# Patient Record
Sex: FEMALE | Race: WHITE | NOT HISPANIC OR LATINO | URBAN - METROPOLITAN AREA
[De-identification: names, ages, dates, MRNs, and addresses within clinical notes are randomized per-mention and may not be internally consistent; named-entity substitution may affect disease eponyms.]

---

## 2017-05-01 PROBLEM — Z00.00 ENCOUNTER FOR PREVENTIVE HEALTH EXAMINATION: Status: ACTIVE | Noted: 2017-05-01

## 2017-05-02 ENCOUNTER — OUTPATIENT (OUTPATIENT)
Dept: OUTPATIENT SERVICES | Facility: HOSPITAL | Age: 47
LOS: 1 days | Discharge: HOME | End: 2017-05-02

## 2017-06-28 DIAGNOSIS — Z80.3 FAMILY HISTORY OF MALIGNANT NEOPLASM OF BREAST: ICD-10-CM

## 2017-10-10 ENCOUNTER — APPOINTMENT (OUTPATIENT)
Dept: BREAST CENTER | Facility: CLINIC | Age: 47
End: 2017-10-10

## 2017-10-20 ENCOUNTER — OUTPATIENT (OUTPATIENT)
Dept: OUTPATIENT SERVICES | Facility: HOSPITAL | Age: 47
LOS: 1 days | Discharge: HOME | End: 2017-10-20

## 2017-10-20 DIAGNOSIS — Z12.31 ENCOUNTER FOR SCREENING MAMMOGRAM FOR MALIGNANT NEOPLASM OF BREAST: ICD-10-CM

## 2017-10-24 ENCOUNTER — APPOINTMENT (OUTPATIENT)
Dept: BREAST CENTER | Facility: CLINIC | Age: 47
End: 2017-10-24

## 2017-11-16 ENCOUNTER — APPOINTMENT (OUTPATIENT)
Dept: BREAST CENTER | Facility: CLINIC | Age: 47
End: 2017-11-16
Payer: COMMERCIAL

## 2017-11-16 VITALS
OXYGEN SATURATION: 98 % | WEIGHT: 118 LBS | DIASTOLIC BLOOD PRESSURE: 80 MMHG | BODY MASS INDEX: 23.16 KG/M2 | SYSTOLIC BLOOD PRESSURE: 110 MMHG | HEIGHT: 60 IN | HEART RATE: 78 BPM

## 2017-11-16 DIAGNOSIS — R92.8 OTHER ABNORMAL AND INCONCLUSIVE FINDINGS ON DIAGNOSTIC IMAGING OF BREAST: ICD-10-CM

## 2017-11-16 DIAGNOSIS — Z78.9 OTHER SPECIFIED HEALTH STATUS: ICD-10-CM

## 2017-11-16 DIAGNOSIS — Z80.3 FAMILY HISTORY OF MALIGNANT NEOPLASM OF BREAST: ICD-10-CM

## 2017-11-16 PROCEDURE — 99212 OFFICE O/P EST SF 10 MIN: CPT

## 2018-06-28 ENCOUNTER — FORM ENCOUNTER (OUTPATIENT)
Age: 48
End: 2018-06-28

## 2018-06-29 ENCOUNTER — OUTPATIENT (OUTPATIENT)
Dept: OUTPATIENT SERVICES | Facility: HOSPITAL | Age: 48
LOS: 1 days | Discharge: HOME | End: 2018-06-29

## 2018-06-29 DIAGNOSIS — R92.8 OTHER ABNORMAL AND INCONCLUSIVE FINDINGS ON DIAGNOSTIC IMAGING OF BREAST: ICD-10-CM

## 2018-11-15 ENCOUNTER — APPOINTMENT (OUTPATIENT)
Dept: BREAST CENTER | Facility: CLINIC | Age: 48
End: 2018-11-15
Payer: COMMERCIAL

## 2019-01-10 ENCOUNTER — APPOINTMENT (OUTPATIENT)
Dept: BREAST CENTER | Facility: CLINIC | Age: 49
End: 2019-01-10
Payer: COMMERCIAL

## 2019-01-10 ENCOUNTER — RESULT CHARGE (OUTPATIENT)
Age: 49
End: 2019-01-10

## 2019-01-10 ENCOUNTER — OUTPATIENT (OUTPATIENT)
Dept: OUTPATIENT SERVICES | Facility: HOSPITAL | Age: 49
LOS: 1 days | Discharge: HOME | End: 2019-01-10

## 2019-01-10 ENCOUNTER — APPOINTMENT (OUTPATIENT)
Dept: OBGYN | Facility: CLINIC | Age: 49
End: 2019-01-10

## 2019-01-10 VITALS
HEART RATE: 81 BPM | BODY MASS INDEX: 23.16 KG/M2 | DIASTOLIC BLOOD PRESSURE: 70 MMHG | SYSTOLIC BLOOD PRESSURE: 118 MMHG | OXYGEN SATURATION: 98 % | WEIGHT: 118 LBS | HEIGHT: 60 IN

## 2019-01-10 VITALS
WEIGHT: 218 LBS | SYSTOLIC BLOOD PRESSURE: 125 MMHG | BODY MASS INDEX: 42.8 KG/M2 | HEIGHT: 60 IN | DIASTOLIC BLOOD PRESSURE: 73 MMHG

## 2019-01-10 DIAGNOSIS — Z01.419 ENCOUNTER FOR GYNECOLOGICAL EXAMINATION (GENERAL) (ROUTINE) W/OUT ABNORMAL FINDINGS: ICD-10-CM

## 2019-01-10 DIAGNOSIS — N62 HYPERTROPHY OF BREAST: ICD-10-CM

## 2019-01-10 LAB
CLARITY UR: CLEAR
GLUCOSE UR-MCNC: NORMAL
HGB UR QL STRIP.AUTO: NORMAL
LEUKOCYTE ESTERASE UR QL STRIP: NORMAL
NITRITE UR QL STRIP: NORMAL
PROT UR STRIP-MCNC: NORMAL

## 2019-01-10 PROCEDURE — 99212 OFFICE O/P EST SF 10 MIN: CPT

## 2019-01-11 ENCOUNTER — FORM ENCOUNTER (OUTPATIENT)
Age: 49
End: 2019-01-11

## 2019-01-11 NOTE — HISTORY OF PRESENT ILLNESS
[1 Year Ago] : 1 year ago [Good] : being in good health [Healthy Diet] : a healthy diet [Regular Exercise] : regular exercise [Up to Date] : up to date with ~his/her~ STD screening [Weight Concerns] : no concerns with her weight [Menstrual Problems] : reports normal menses [Fever] : no fever [Nausea] : no nausea [Vomiting] : no vomiting [Diarrhea] : no diarrhea [Vaginal Bleeding] : no vaginal bleeding [Pelvic Pressure] : no pelvic pressure [Dysuria] : no dysuria

## 2019-01-12 ENCOUNTER — OUTPATIENT (OUTPATIENT)
Dept: OUTPATIENT SERVICES | Facility: HOSPITAL | Age: 49
LOS: 1 days | Discharge: HOME | End: 2019-01-12

## 2019-01-12 DIAGNOSIS — Z12.31 ENCOUNTER FOR SCREENING MAMMOGRAM FOR MALIGNANT NEOPLASM OF BREAST: ICD-10-CM

## 2019-01-14 DIAGNOSIS — Z00.00 ENCOUNTER FOR GENERAL ADULT MEDICAL EXAMINATION WITHOUT ABNORMAL FINDINGS: ICD-10-CM

## 2019-07-07 ENCOUNTER — FORM ENCOUNTER (OUTPATIENT)
Age: 49
End: 2019-07-07

## 2019-07-08 ENCOUNTER — OUTPATIENT (OUTPATIENT)
Dept: OUTPATIENT SERVICES | Facility: HOSPITAL | Age: 49
LOS: 1 days | Discharge: HOME | End: 2019-07-08
Payer: COMMERCIAL

## 2019-07-08 DIAGNOSIS — N64.89 OTHER SPECIFIED DISORDERS OF BREAST: ICD-10-CM

## 2019-07-08 PROCEDURE — 77049 MRI BREAST C-+ W/CAD BI: CPT | Mod: 26

## 2019-07-23 ENCOUNTER — APPOINTMENT (OUTPATIENT)
Dept: BREAST CENTER | Facility: CLINIC | Age: 49
End: 2019-07-23
Payer: COMMERCIAL

## 2019-07-23 VITALS
SYSTOLIC BLOOD PRESSURE: 120 MMHG | TEMPERATURE: 97.8 F | WEIGHT: 112 LBS | HEIGHT: 60 IN | DIASTOLIC BLOOD PRESSURE: 70 MMHG | BODY MASS INDEX: 21.99 KG/M2

## 2019-07-23 PROCEDURE — 99212 OFFICE O/P EST SF 10 MIN: CPT

## 2019-07-23 NOTE — PAST MEDICAL HISTORY
[Menstruating] : The patient is menstruating [Menarche Age ____] : age at menarche was [unfilled] [Regular Cycle Intervals] : have been regular [Total Preg ___] : G[unfilled] [Live Births ___] : P[unfilled]  [Age At Live Birth ___] : Age at live birth: [unfilled] [FreeTextEntry5] : none [FreeTextEntry6] : none [FreeTextEntry7] : OCP for 5 years; discontinued in 1997. [FreeTextEntry8] :  second child for three months.

## 2019-07-23 NOTE — PHYSICAL EXAM
[No Supraclavicular Adenopathy] : no supraclavicular adenopathy [Examined in the supine and seated position] : examined in the supine and seated position [Symmetrical] : symmetrical [No dominant masses] : no dominant masses in right breast  [No dominant masses] : no dominant masses left breast [No Nipple Retraction] : no left nipple retraction [No Nipple Discharge] : no left nipple discharge [Breast Mass Right Breast ___cm] : no masses [Breast Mass Left Breast ___cm] : no masses [Breast Nipple Inversion] : nipples not inverted [Breast Nipple Flattening] : nipples not flattened [Breast Nipple Retraction] : nipples not retracted [Breast Nipple Fissures] : nipples not fissured [Breast Abnormal Secretion Bloody Fluid] : no bloody discharge [Breast Abnormal Lactation (Galactorrhea)] : no galactorrhea [Breast Abnormal Secretion Serous Fluid] : no serous discharge [Breast Abnormal Secretion Opalescent Fluid] : no milky discharge [No Axillary Lymphadenopathy] : no left axillary lymphadenopathy [No Edema] : no edema [No Swelling] : no swelling [Full ROM] : full range of motion [No Rashes] : no rashes [No Ulceration] : no ulceration

## 2019-07-23 NOTE — ASSESSMENT
[FreeTextEntry1] : 49 year old female with history of left breast calcifications not amenable to stereotactic core biopsy, status post lumpectomy demonstrating fibrocystic changes.  She also has a history of a benign left breast core biopsy demonstrating PASH.  She has a strong family history of breast cancer in her sister at 48 and her Mary Model risk assessment for breast cancer is 20% in her lifetime.  She is status post bilateral breast augmentation.\par \par Bilateral screening mammogram was performed in January.  This demonstrated heterogeneously dense breasts with bilateral saline implants intact.  There is an area of benign architectural distortion corresponding to the site of surgery in the left breast.  There are no suspicious masses, groups of calcifications or other abnormalities identified.  Bilateral breast MRI was performed in July for high risk screening.  This demonstrated bilateral scattered similar-enhancing foci most consistent with normal background enhancement.  There is no suspicious abnormal enhancement seen in either breast.\par \par She is here for evaluation of these findings.  At this time, these findings do not present the need for surgical intervention.  She has a benign clinical breast examination and no current complaints related to the breasts. For now, she will need a bilateral screening mammogram for January 2020, with subsequent follow up clinical breast examination.  I spent a total of 10 minutes of face to face time with this patient, greater than 50% of which was spent in counseling and/or coordination of care.  All of her questions were appropriately answered.\par

## 2019-07-23 NOTE — HISTORY OF PRESENT ILLNESS
[FreeTextEntry1] : Patient with history of Left breast calcifications not amenable to stereotactic core biopsy; status post Left NLOC 8/20/12 demonstrating fibrocystic changes with florid duct hyperplasia, sclerosing adenosis, papillary/cystic apocrine metaplasia.\par No prior complaints related to the breasts. \par \par Her family history is significant for her sister with breast cancer at 48.  \par Her Mary Model risk assessment is:\par 2.0 % in five years \par 19.7 % in her lifetime.\par \par Left breast fibrocystic changes and PASH on ultrasound guided core biopsy 11/7/16; 1:00 N5, 12 mm.

## 2020-01-13 ENCOUNTER — FORM ENCOUNTER (OUTPATIENT)
Age: 50
End: 2020-01-13

## 2020-01-14 ENCOUNTER — OUTPATIENT (OUTPATIENT)
Dept: OUTPATIENT SERVICES | Facility: HOSPITAL | Age: 50
LOS: 1 days | Discharge: HOME | End: 2020-01-14
Payer: COMMERCIAL

## 2020-01-14 DIAGNOSIS — Z12.31 ENCOUNTER FOR SCREENING MAMMOGRAM FOR MALIGNANT NEOPLASM OF BREAST: ICD-10-CM

## 2020-01-14 PROCEDURE — 77063 BREAST TOMOSYNTHESIS BI: CPT | Mod: 26

## 2020-01-14 PROCEDURE — 77067 SCR MAMMO BI INCL CAD: CPT | Mod: 26

## 2020-01-21 ENCOUNTER — APPOINTMENT (OUTPATIENT)
Dept: BREAST CENTER | Facility: CLINIC | Age: 50
End: 2020-01-21
Payer: COMMERCIAL

## 2020-01-26 ENCOUNTER — FORM ENCOUNTER (OUTPATIENT)
Age: 50
End: 2020-01-26

## 2020-01-27 ENCOUNTER — OUTPATIENT (OUTPATIENT)
Dept: OUTPATIENT SERVICES | Facility: HOSPITAL | Age: 50
LOS: 1 days | Discharge: HOME | End: 2020-01-27
Payer: COMMERCIAL

## 2020-01-27 DIAGNOSIS — R92.8 OTHER ABNORMAL AND INCONCLUSIVE FINDINGS ON DIAGNOSTIC IMAGING OF BREAST: ICD-10-CM

## 2020-01-27 PROCEDURE — 77065 DX MAMMO INCL CAD UNI: CPT | Mod: 26,RT

## 2020-01-27 PROCEDURE — G0279: CPT | Mod: 26,RT

## 2020-02-24 ENCOUNTER — APPOINTMENT (OUTPATIENT)
Dept: BREAST CENTER | Facility: CLINIC | Age: 50
End: 2020-02-24
Payer: COMMERCIAL

## 2020-02-24 VITALS
SYSTOLIC BLOOD PRESSURE: 112 MMHG | HEIGHT: 60 IN | WEIGHT: 118 LBS | BODY MASS INDEX: 23.16 KG/M2 | TEMPERATURE: 98.3 F | DIASTOLIC BLOOD PRESSURE: 70 MMHG

## 2020-02-24 PROCEDURE — 99212 OFFICE O/P EST SF 10 MIN: CPT

## 2020-02-24 NOTE — ASSESSMENT
[FreeTextEntry1] : 49 year old female who presents today for her clinical breast exam.  She has a history of left breast calcifications not amenable to stereotactic core biopsy, status post lumpectomy 8/20/12 demonstrating fibrocystic changes.  She has no prior complaints related to the breasts and history of bilateral breast augmentation.  Her family history is significant for her sister with breast cancer at 48.  She is high risk for breast cancer with a Mary Model risk assessment of 20% in her lifetime.  \par \par Bilateral screening mammogram was performed in January.  This demonstrated heterogeneously dense breasts.  There are bilateral saline implants. There are calcifications seen in the upper outer quadrant of the right breast.  There are no suspicious masses, groupings of calcifications or other abnormalities identified in the left breast.  Right breast diagnostic mammogram recall demonstrated grouped amorphous calcifications in the upper outer quadrant of the right breast that were likely present on the prior mammogram of 2015.  These are probably benign and short term follow up was recommended.\par \par She is here for evaluation of these findings.  At this time, these findings do not present the need for surgical intervention.  She has a benign clinical breast examination and no current complaints related to the breasts. For now, she will need right breast diagnostic mammogram for July 2020.  She will also be due for bilateral breast MR wwo contrast for high risk screening at that time, with subsequent follow up clinical breast examination.  I spent a total of 10 minutes of face to face time with this patient, greater than 50% of which was spent in counseling and/or coordination of care.  All of her questions were appropriately answered.\par

## 2020-02-24 NOTE — PAST MEDICAL HISTORY
[FreeTextEntry5] : none [FreeTextEntry6] : none [FreeTextEntry8] :  second child for three months.   [FreeTextEntry7] : OCP for 5 years; discontinued in 1997.

## 2020-02-24 NOTE — PHYSICAL EXAM
[Atraumatic] : atraumatic [Normocephalic] : normocephalic [No Supraclavicular Adenopathy] : no supraclavicular adenopathy [Supple] : supple [No Thyromegaly] : no thyromegaly [No Cervical Adenopathy] : no cervical adenopathy [Examined in the supine and seated position] : examined in the supine and seated position [Symmetrical] : symmetrical [No dominant masses] : no dominant masses in right breast  [No dominant masses] : no dominant masses left breast [No Nipple Retraction] : no left nipple retraction [No Nipple Discharge] : no right nipple discharge [Breast Mass Left Breast ___cm] : no masses [Breast Mass Right Breast ___cm] : no masses [Breast Nipple Inversion] : nipples not inverted [Breast Nipple Flattening] : nipples not flattened [Breast Nipple Retraction] : nipples not retracted [Breast Abnormal Secretion Bloody Fluid] : no bloody discharge [Breast Nipple Fissures] : nipples not fissured [Breast Abnormal Lactation (Galactorrhea)] : no galactorrhea [Breast Abnormal Secretion Serous Fluid] : no serous discharge [No Axillary Lymphadenopathy] : no left axillary lymphadenopathy [Breast Abnormal Secretion Opalescent Fluid] : no milky discharge [Full ROM] : full range of motion [No Swelling] : no swelling [No Edema] : no edema [No Ulceration] : no ulceration [No Rashes] : no rashes

## 2020-02-24 NOTE — REVIEW OF SYSTEMS
[Fever] : no fever [Chills] : no chills [Breast Pain] : no breast pain [Breast Lump] : no breast lump [Breast Reddening] : no reddening of the breast [Breast Swelling] : no breast swelling [Breast Itching] : no breast itching [Enlargement] : no breast enlargement [Breast Warmth] : no breast warmth [Dimpling Of Skin] : no dimpling of breast skin [Decreasing In Size] : breast size not decreasing [Nipple Discharge] : no nipple discharge ['Orange Peel' Appearance] : no 'orange peel' appearance of breast skin [Nipple Inverted] : no inversion of the nipple

## 2020-02-24 NOTE — DATA REVIEWED
[FreeTextEntry1] : EXAM: MR BREAST WAW IC BI \par PROCEDURE DATE: 07/08/2019 \par INTERPRETATION: Clinical History / Reason for exam: High risk breast cancer screening. \par Additional history: No personal history of cancer. The patient has a history of left needle biopsy at age 46-\par benign, left excisional biopsy at age 43-benign, bilateral breast augmentation at age 40. The patient has a \par following family history of breast cancer: Sister, age 50, breast cancer. \par Technique: Breast MRI is performed at 1.5 T with the patient prone and the breasts in a dedicated breast coil. \par Following a 3 plane localizer, sagittal T1 weighted, fat-saturated T1 weighted and fat saturated T2-weighted \par sequence; dynamic contrast enhanced sagittal images; and delayed post-contrast axial fat-saturated T1 \par weighted images were obtained. 5 mL gadolinium contrast was injected and 2.5 mL was discarded. Subtraction \par and MIP images were reviewed. [employer] software was used in interpretation. \par Comparison: Comparison is made to the prior breast MRIs dated 6/29/2018 and 5/2/2017. \par Findings: \par Amount of fibroglandular tissue: Heterogeneous fibroglandular tissue \par Background parenchymal enhancement: Mild, Symmetric\par Bilateral retropectoral implants are present. There are bilateral scattered similar-appearing enhancing foci \par within both breasts most consistent with normal background parenchymal enhancement. \par RIGHT BREAST: \par No suspicious abnormal enhancement is seen in the right breast. \par LEFT BREAST:\par No suspicious abnormal enhancement is seen in the left breast. Small focus of susceptibility artifact is noted in \par the upper outer quadrant of the left breast, compatible with a biopsy clip. \par There is no axillary lymphadenopathy. \par Impression: \par No suspicious enhancement in either breast. \par Recommendation: Unless otherwise indicated by clinical findings, annual screening mammography \par recommended. \par BI-RADS Category 1: Negative \par SHELLI VYAS M.D., RESIDENT RADIOLOGIST \par This document has been electronically signed. \par YASMINE VIERA M.D., ATTENDING RADIOLOGIST \par This document has been electronically signed. Jul 8 2019 4:45PM\par \par \par \par EXAM: MG MAMMO SCREEN W DHEERAJ BI# \par PROCEDURE DATE: 01/14/2020 \par INTERPRETATION: HISTORY: \par Bilateral MG MAMMO SCREEN W DHEERAJ BI# was performed. Patient is 49 years old and is seen for screening. The patient \par has no personal history of cancer. The patient has a history of left needle biopsy at age 46 - benign, left excisional biopsy \par at age 43 - benign and bilateral breast augmentation at age 40. The patient has the following family history of breast \par cancer: sister, at age 50, breast cancer. \par RISK ASSESSMENT: \par NCI Lifetime Risk: 21.7 \par Randier-Karelck Lifetime Risk: 16.5 \par CLINICAL BREAST EXAM: \par The patient reports her last clinical breast exam was performed 3 months ago. \par COMPARISON STUDIES: \par The present examination has been compared to prior imaging studies performed at [facility] on \par 04/28/2017, 10/20/2017 and 01/12/2019. \par MAMMOGRAM FINDINGS: \par Mammography was performed including the following views: bilateral craniocaudal implant displaced, bilateral \par craniocaudal with tomosynthesis, bilateral mediolateral oblique implant displaced, and bilateral mediolateral oblique with \par tomosynthesis. The examination includes digital synthetic 2D and digital tomosynthesis 3D images. Additional imaging \par analysis was performed using CAD (computer-aided detection) software. \par The breasts are heterogeneously dense, which may obscure small masses. \par There are bilateral saline implants. \par There are calcifications seen in the upper outer quadrant of the right breast. \par No suspicious mass, grouping of calcifications, or other abnormality is identified in the left breast.\par IMPRESSION: \par Calcifications in the right breast require additional evaluation.\par RECOMMENDATION: \par Patient will be recalled for additional views. \par ASSESSMENT: \par BI-RADS Category 0: Incomplete: Needs Additional Imaging Evaluation \par Given the patient's history, she meets the American Cancer Society guidelines for annual screening with breast MRI in \par addition to annual mammography (i.e., lifetime risk greater than 20-25%) \par The patient will be notified of these results by telephone, and will also be mailed a written summary in layman's terms. \par LOBITO BOBO M.D., RESIDENT RADIOLOGIST \par This document has been electronically signed. \par KARINA HEAD M.D., ATTENDING RADIOLOGIST \par This document has been electronically signed. Jan 14 2020 11:28AM\par \par \par \par \par EXAM: MG MAMMO DIAG W DHEERAJ RT# \par PROCEDURE DATE: 01/27/2020 \par INTERPRETATION: CLINICAL HISTORY: Additional imaging requested from screening mammogram. The patient returns for \par additional imaging of calcifications in the right breast. \par FAMILY HISTORY: Her sister at the age of 50. \par The patient reports her last clinical breast examination was performed 3 months ago. \par COMPARISON: Mammograms dating back to 2015. \par BREAST COMPOSITION: The breasts are heterogeneously dense, which may obscure small masses. \par VIEWS: 2-D/tomosynthesis implant displaced ML and spot magnification ML and CC views were obtained of the right \par breast. Computer-aided detection was utilized by the radiologists in the interpretation of this examination. \par FINDINGS: \par Grouped amorphous calcifications in the upper outer quadrant of the right breast were likely present on the prior \par mammograms of 2015. These are probably benign and continued follow-up is recommended. \par No suspicious masses or areas of architectural distortion are seen. \par IMPRESSION: \par Probably benign calcifications in the right breast as above. \par Recommendation: Follow-up unilateral diagnostic mammogram in 6 months. \par BI-RADS category 3: Probably Benign \par The findings and recommendations were discussed with the patient.\par YASMINE VIERA M.D., ATTENDING RADIOLOGIST \par This document has been electronically signed. Jan 27 2020 8:38AM

## 2020-02-24 NOTE — HISTORY OF PRESENT ILLNESS
[FreeTextEntry1] : Patient with history of Left breast calcifications not amenable to stereotactic core biopsy; status post Left NLOC 8/20/12 demonstrating fibrocystic changes with florid duct hyperplasia, sclerosing adenosis, papillary/cystic apocrine metaplasia.\par No prior complaints related to the breasts. \par \par Her family history is significant for her sister with breast cancer at 48.  \par Her Mary Model risk assessment is:\par 2.0 % in five years \par 19.7 % in her lifetime.\par \par Left breast fibrocystic changes and PASH on ultrasound guided core biopsy 11/7/16; 1:00 N5, 12 mm.\par \par Right brest calcifications seen on screening mammogram 1/14/20 and also on diagnostic call back 1/27/20; likely present in 2015, and requiring short term follow up.  Right breast diagnostic mammogram due for July 2020.

## 2020-08-10 ENCOUNTER — RESULT REVIEW (OUTPATIENT)
Age: 50
End: 2020-08-10

## 2020-08-10 ENCOUNTER — OUTPATIENT (OUTPATIENT)
Dept: OUTPATIENT SERVICES | Facility: HOSPITAL | Age: 50
LOS: 1 days | Discharge: HOME | End: 2020-08-10
Payer: COMMERCIAL

## 2020-08-10 DIAGNOSIS — R92.1 MAMMOGRAPHIC CALCIFICATION FOUND ON DIAGNOSTIC IMAGING OF BREAST: ICD-10-CM

## 2020-08-10 DIAGNOSIS — Z12.39 ENCOUNTER FOR OTHER SCREENING FOR MALIGNANT NEOPLASM OF BREAST: ICD-10-CM

## 2020-08-10 PROCEDURE — 77049 MRI BREAST C-+ W/CAD BI: CPT | Mod: 26

## 2020-12-21 PROBLEM — Z01.419 ENCOUNTER FOR ANNUAL ROUTINE GYNECOLOGICAL EXAMINATION: Status: RESOLVED | Noted: 2019-01-11 | Resolved: 2020-12-21

## 2021-01-29 ENCOUNTER — NON-APPOINTMENT (OUTPATIENT)
Age: 51
End: 2021-01-29

## 2021-01-29 ENCOUNTER — OUTPATIENT (OUTPATIENT)
Dept: OUTPATIENT SERVICES | Facility: HOSPITAL | Age: 51
LOS: 1 days | Discharge: HOME | End: 2021-01-29
Payer: COMMERCIAL

## 2021-01-29 ENCOUNTER — RESULT REVIEW (OUTPATIENT)
Age: 51
End: 2021-01-29

## 2021-01-29 DIAGNOSIS — R92.8 OTHER ABNORMAL AND INCONCLUSIVE FINDINGS ON DIAGNOSTIC IMAGING OF BREAST: ICD-10-CM

## 2021-01-29 PROCEDURE — 77066 DX MAMMO INCL CAD BI: CPT | Mod: 26

## 2021-01-29 PROCEDURE — G0279: CPT | Mod: 26

## 2021-03-25 ENCOUNTER — APPOINTMENT (OUTPATIENT)
Dept: BREAST CENTER | Facility: CLINIC | Age: 51
End: 2021-03-25
Payer: COMMERCIAL

## 2021-03-25 VITALS
DIASTOLIC BLOOD PRESSURE: 78 MMHG | TEMPERATURE: 98 F | HEIGHT: 60 IN | WEIGHT: 125 LBS | BODY MASS INDEX: 24.54 KG/M2 | SYSTOLIC BLOOD PRESSURE: 118 MMHG

## 2021-03-25 PROCEDURE — 99072 ADDL SUPL MATRL&STAF TM PHE: CPT

## 2021-03-25 PROCEDURE — 99212 OFFICE O/P EST SF 10 MIN: CPT

## 2021-03-25 NOTE — ASSESSMENT
[FreeTextEntry1] : CROW is a [place] 50 year old patient who presented today in follow up for a history of fibrocystic breast changes; family hx of breast cancer; high risk screening.  \par She has been doing well with no new breast related complaints. \par Imaging was done recently which revealed no significant interval change on the left side. Yearly left breast screening mammogram is suggested. Stable probably benign group of amorphous calcifications at the right lateral central breast. Short-term follow-up mammogram is suggested in 6 months, as detailed above. \par Physical exam was unrevealing today.\par \par Imaging with a right breast unilateral diagnostic mammogram will be due in July 2021 for short term follow-up on calcifications, and that will be scheduled today. \par She will also be scheduled for bilateral breast MRI in August 2021 for high risk screening.\par She will return for follow-up and clinical breast exam in six months.\par \par I spent a total of 15 minutes of face to face time with this patient, greater than 50% of which was spent in counseling and/or coordination of care.\par All of her questions were appropriately answered.\par She knows to call with any concerns.\par \par \par

## 2021-03-25 NOTE — HISTORY OF PRESENT ILLNESS
[FreeTextEntry1] : Patient with history of Left breast calcifications not amenable to stereotactic core biopsy; status post Left NLOC 8/20/12 demonstrating fibrocystic changes with florid duct hyperplasia, sclerosing adenosis, papillary/cystic apocrine metaplasia.\par No prior complaints related to the breasts. \par \par Her family history is significant for her sister with breast cancer at 48.  \par Her Mary Model risk assessment is:\par 2.0 % in five years \par 19.7 % in her lifetime.\par \par Left breast fibrocystic changes and PASH on ultrasound guided core biopsy 11/7/16; 1:00 N5, 12 mm.\par \par \par CROW WEATHERS is a 50 year old female patient who presents today in follow up for fibrocystic breast changes; family hx of breast cancer; high risk screening.\par Since her last visit, she has no new breast related complaints. \par Imaging was done on 01/29/2021, which revealed no significant interval change on the left side. Yearly left breast screening mammogram is suggested. Stable probably benign group of amorphous calcifications at the right lateral central breast. Short-term follow-up mammogram is suggested in 6 months.\par \par She presents today for evaluation and imaging review.

## 2021-03-25 NOTE — DATA REVIEWED
[FreeTextEntry1] : B/L Breast MRI - 08/10/2020:\par Findings:\par \par Amount of fibroglandular tissue: Heterogeneous fibroglandular tissue\par \par Background parenchymal enhancement: Mild, Symmetric\par \par Bilateral retropectoral saline implants are present.\par \par RIGHT BREAST:\par No enhancing mass, architectural distortion, or suspicious area of enhancement is identified.\par \par The nipple and skin appear normal.\par There is no axillary adenopathy.\par \par LEFT BREAST:\par No enhancing mass, architectural distortion, or suspicious area of enhancement is identified.\par The nipple and skin appear normal.\par There is no axillary adenopathy.\par \par The imaged portions of the chest and abdomen are unremarkable.\par \par Impression:\par \par No suspicious enhancement in either breast.\par Bilateral retropectoral saline implants.\par \par Recommendation: Unless otherwise indicated by clinical findings, annual screening mammography recommended.\par \par BI-RADS Category 1: Negative\par \par \par B/L Dx Mammo - 01/29/2021:\par BREAST COMPOSITION: The breasts are heterogeneously dense, which may obscure small masses.\par \par FINDINGS:\par \par MAMMOGRAM:\par \par There are bilateral retropectoral saline implants.\par The previously seen group of probably benign microcalcifications in the right lateral central breast is again identified, without significant interval change. These calcifications are likely present on prior study of 2015.\par \par A biopsy marker is present in the left lateral breast. A group of calcifications in the left upper outer quadrant is unchanged dating back to at least 2015. A subcentimeter obscured mass in the left superior breast is also stable. Mild postsurgical distortion is present in the left superior breast, unchanged.\par \par No new mass or suspicious groups of microcalcifications are seen.\par \par Skin, nipples and subcutaneous tissues are unremarkable.\par \par IMPRESSION:\par \par No significant interval change on the left side. Yearly left breast screening mammogram is suggested.\par \par Stable probably benign group of amorphous calcifications at the right lateral central breast. Short-term follow-up mammogram is suggested in 6 months.\par \par Recommendation: Follow-up unilateral diagnostic mammogram in 6 months.\par \par BI-RADS category 3: Probably Benign

## 2021-03-25 NOTE — PHYSICAL EXAM
[Normocephalic] : normocephalic [Atraumatic] : atraumatic [No Supraclavicular Adenopathy] : no supraclavicular adenopathy [No Cervical Adenopathy] : no cervical adenopathy [Examined in the supine and seated position] : examined in the supine and seated position [No dominant masses] : no dominant masses in right breast  [No dominant masses] : no dominant masses left breast [No Nipple Discharge] : no left nipple discharge [No Axillary Lymphadenopathy] : no left axillary lymphadenopathy [No Rashes] : no rashes [No Ulceration] : no ulceration [Breast Nipple Inversion] : nipples not inverted [Breast Nipple Retraction] : nipples not retracted [de-identified] : well healed surgical scars.

## 2021-03-25 NOTE — REASON FOR VISIT
[Follow-Up: _____] : a [unfilled] follow-up visit [FreeTextEntry1] : fibrocystic breast changes; family hx of breast cancer; high risk screening; imaging review.

## 2021-07-30 ENCOUNTER — OUTPATIENT (OUTPATIENT)
Dept: OUTPATIENT SERVICES | Facility: HOSPITAL | Age: 51
LOS: 1 days | Discharge: HOME | End: 2021-07-30
Payer: COMMERCIAL

## 2021-07-30 ENCOUNTER — RESULT REVIEW (OUTPATIENT)
Age: 51
End: 2021-07-30

## 2021-07-30 DIAGNOSIS — R92.8 OTHER ABNORMAL AND INCONCLUSIVE FINDINGS ON DIAGNOSTIC IMAGING OF BREAST: ICD-10-CM

## 2021-07-30 PROCEDURE — 77065 DX MAMMO INCL CAD UNI: CPT | Mod: 26,RT

## 2021-07-30 PROCEDURE — G0279: CPT | Mod: 26

## 2021-08-03 ENCOUNTER — NON-APPOINTMENT (OUTPATIENT)
Age: 51
End: 2021-08-03

## 2021-09-02 ENCOUNTER — OUTPATIENT (OUTPATIENT)
Dept: OUTPATIENT SERVICES | Facility: HOSPITAL | Age: 51
LOS: 1 days | Discharge: HOME | End: 2021-09-02
Payer: COMMERCIAL

## 2021-09-02 ENCOUNTER — RESULT REVIEW (OUTPATIENT)
Age: 51
End: 2021-09-02

## 2021-09-02 DIAGNOSIS — N60.12 DIFFUSE CYSTIC MASTOPATHY OF LEFT BREAST: ICD-10-CM

## 2021-09-02 DIAGNOSIS — Z12.39 ENCOUNTER FOR OTHER SCREENING FOR MALIGNANT NEOPLASM OF BREAST: ICD-10-CM

## 2021-09-02 PROCEDURE — 77049 MRI BREAST C-+ W/CAD BI: CPT | Mod: 26

## 2021-09-10 ENCOUNTER — APPOINTMENT (OUTPATIENT)
Dept: BREAST CENTER | Facility: CLINIC | Age: 51
End: 2021-09-10

## 2021-09-22 ENCOUNTER — APPOINTMENT (OUTPATIENT)
Dept: BREAST CENTER | Facility: CLINIC | Age: 51
End: 2021-09-22
Payer: COMMERCIAL

## 2021-09-22 PROCEDURE — 99212 OFFICE O/P EST SF 10 MIN: CPT

## 2021-09-22 NOTE — HISTORY OF PRESENT ILLNESS
[FreeTextEntry1] : Patient with history of Left breast calcifications not amenable to stereotactic core biopsy; status post Left NLOC 8/20/12 demonstrating fibrocystic changes with florid duct hyperplasia, sclerosing adenosis, papillary/cystic apocrine metaplasia.\par No prior complaints related to the breasts. \par B/L saline implants - subpectoral.\par \par Her family history is significant for her sister with breast cancer at 48.  \par \par Her Mary Model risk assessment is:\par 2.0 % in five years \par 19.7 % in her lifetime.\par \par Left breast fibrocystic changes and PASH on ultrasound guided core biopsy 11/7/16; 1:00 N5, 12 mm.\par \par \par CROW WEATHERS is a 51 year old female patient who presents today in follow up for fibrocystic breast changes; family hx of breast cancer; high risk screening.\par Since her last visit, she has no new breast related complaints. \par Imaging of the right breast was done on 07/30/2021 for short term follow-up, which revealed grouped amorphous calcifications in the right lateral breast demonstrate the suggestion of rim formation. In addition, these were likely present on prior mammograms dating back to 2016. These are stable from 1/27/2020 and are probably benign. \par She had a B/L breast MRI on 09/02/2021 for high risk screening, which revealed no MR evidence of malignancy in either breast.\par \par She presents today for evaluation and imaging review.

## 2021-09-22 NOTE — DATA REVIEWED
[FreeTextEntry1] : Right Uni Dx Mammo - 07/30/2021:\par BREAST COMPOSITION: The breasts are heterogeneously dense, which may obscure small masses.\par \par FINDINGS:\par \par MAMMOGRAM:\par Grouped amorphous calcifications in the right lateral breast demonstrate the suggestion of rim formation. In addition, these were likely present on prior mammograms dating back to 2016. These are stable from 1/27/2020 and are probably benign. Continued mammographic follow-up is recommended to demonstrate stability.\par \par No suspicious masses or areas of architectural distortion are seen in the right breast.\par \par IMPRESSION:\par \par Probably benign right breast calcifications as above.\par \par Recommendation: Follow-up bilateral diagnostic mammogram in 6 months.\par \par BI-RADS category 3: Probably Benign\par \par \par \par B/L Breast MRI - 09/02/2021:\par Findings:\par \par Amount of fibroglandular tissue: Heterogeneous fibroglandular tissue\par \par Background parenchymal enhancement: Mild, Symmetric\par \par Bilateral retropectoral implants.\par \par RIGHT BREAST:\par No suspicious enhancing mass or suspicious area of enhancement is identified.\par \par The nipple and skin appear normal.\par There is no axillary adenopathy.\par \par LEFT BREAST:\par No suspicious enhancing mass or suspicious area of enhancement is identified. Susceptibility artifact from biopsy marker in the lateral left breast.\par \par The nipple and skin appear normal.\par There is no axillary adenopathy.\par \par The imaged portions of the chest and abdomen are unremarkable.\par \par Impression:\par \par No MR evidence of malignancy in either breast.\par \par Recommendation: Unless otherwise indicated by clinical findings, annual screening mammography recommended.\par \par BI-RADS Category 1: Negative

## 2021-09-22 NOTE — ASSESSMENT
[FreeTextEntry1] : CROW is a [place] 51 year old patient who presented today in follow up for a history of fibrocystic breast changes; family hx of breast cancer; high risk screening.  \par She has been doing well with no new breast related complaints. \par Imaging of the right breast was done on 07/30/2021 for short term follow-up, which revealed grouped amorphous calcifications in the right lateral breast demonstrate the suggestion of rim formation. In addition, these were likely present on prior mammograms dating back to 2016. These are stable from 1/27/2020 and are probably benign. \par She had a B/L breast MRI on 09/02/2021 for high risk screening, which revealed no MR evidence of malignancy in either breast, as detailed above. \par Physical exam was unrevealing today.\par \par Imaging with a bilateral diagnostic mammogram will be due in Jan/Feb 2022 for short term follow-up on calcifications, and that will be scheduled today.\par She will return for follow-up and clinical breast exam in six months.\par \par The patient was informed that Dr. Tatyana Mg will no longer be practicing here as of the end of August 2021; her care will be continued with the practice.\par \par I spent a total of 15 minutes of face to face time with this patient, greater than 50% of which was spent in counseling and/or coordination of care.\par All of her questions were appropriately answered.\par She knows to call with any concerns.

## 2021-09-22 NOTE — PHYSICAL EXAM
[Normocephalic] : normocephalic [Atraumatic] : atraumatic [No Supraclavicular Adenopathy] : no supraclavicular adenopathy [No dominant masses] : no dominant masses in right breast  [No dominant masses] : no dominant masses left breast [No Nipple Discharge] : no left nipple discharge [No Rashes] : no rashes [No Ulceration] : no ulceration [Breast Nipple Inversion] : nipples not inverted [Breast Nipple Retraction] : nipples not retracted [de-identified] : well healed surgical scars. [de-identified] : No axillary lymphadenopathy appreciated. [de-identified] : No axillary lymphadenopathy appreciated.

## 2022-02-01 ENCOUNTER — NON-APPOINTMENT (OUTPATIENT)
Age: 52
End: 2022-02-01

## 2022-03-01 ENCOUNTER — RESULT REVIEW (OUTPATIENT)
Age: 52
End: 2022-03-01

## 2022-03-01 ENCOUNTER — OUTPATIENT (OUTPATIENT)
Dept: OUTPATIENT SERVICES | Facility: HOSPITAL | Age: 52
LOS: 1 days | Discharge: HOME | End: 2022-03-01
Payer: COMMERCIAL

## 2022-03-01 DIAGNOSIS — R92.8 OTHER ABNORMAL AND INCONCLUSIVE FINDINGS ON DIAGNOSTIC IMAGING OF BREAST: ICD-10-CM

## 2022-03-01 PROCEDURE — G0279: CPT | Mod: 26

## 2022-03-01 PROCEDURE — 77066 DX MAMMO INCL CAD BI: CPT | Mod: 26

## 2022-03-15 ENCOUNTER — APPOINTMENT (OUTPATIENT)
Dept: OBGYN | Facility: CLINIC | Age: 52
End: 2022-03-15
Payer: COMMERCIAL

## 2022-03-15 VITALS
HEIGHT: 60 IN | BODY MASS INDEX: 22.19 KG/M2 | SYSTOLIC BLOOD PRESSURE: 138 MMHG | DIASTOLIC BLOOD PRESSURE: 90 MMHG | WEIGHT: 113 LBS

## 2022-03-15 PROCEDURE — 99386 PREV VISIT NEW AGE 40-64: CPT

## 2022-03-15 NOTE — HISTORY OF PRESENT ILLNESS
[TextBox_4] : CROW WEATHERS is a 51 year female\par for pap  Hypomagnesemia BPH with obstruction/lower urinary tract symptoms

## 2022-03-17 LAB — HPV HIGH+LOW RISK DNA PNL CVX: NOT DETECTED

## 2022-03-22 LAB — CYTOLOGY CVX/VAG DOC THIN PREP: NORMAL

## 2022-06-14 ENCOUNTER — APPOINTMENT (OUTPATIENT)
Dept: BREAST CENTER | Facility: CLINIC | Age: 52
End: 2022-06-14
Payer: COMMERCIAL

## 2022-06-14 VITALS
WEIGHT: 110 LBS | HEIGHT: 60 IN | SYSTOLIC BLOOD PRESSURE: 136 MMHG | BODY MASS INDEX: 21.6 KG/M2 | DIASTOLIC BLOOD PRESSURE: 86 MMHG | HEART RATE: 73 BPM

## 2022-06-14 DIAGNOSIS — R92.1 MAMMOGRAPHIC CALCIFICATION FOUND ON DIAGNOSTIC IMAGING OF BREAST: ICD-10-CM

## 2022-06-14 PROCEDURE — 99212 OFFICE O/P EST SF 10 MIN: CPT

## 2022-06-14 NOTE — HISTORY OF PRESENT ILLNESS
[FreeTextEntry1] : Patient with history of Left breast calcifications not amenable to stereotactic core biopsy; status post Left NLOC 8/20/12 demonstrating fibrocystic changes with florid duct hyperplasia, sclerosing adenosis, papillary/cystic apocrine metaplasia.\par No prior complaints related to the breasts. \par B/L saline implants - subpectoral.\par \par Her family history is significant for her sister with breast cancer at 48.  \par \par Her Mary Model risk assessment is:\par 2.0 % in five years \par 19.7 % in her lifetime.\par \par Left breast fibrocystic changes and PASH on ultrasound guided core biopsy 11/7/16; 1:00 N5, 12 mm.\par \par \par CROW WEATHERS is a 51 year old female patient who presents today in follow up for fibrocystic breast changes; family hx of breast cancer; high risk screening.\par Since her last visit, she has no new breast related complaints. \par Imaging of the right breast was done on 07/30/2021 for short term follow-up, which revealed grouped amorphous calcifications in the right lateral breast demonstrate the suggestion of rim formation. In addition, these were likely present on prior mammograms dating back to 2016. These are stable from 1/27/2020 and are probably benign. \par She had a B/L breast MRI on 09/02/2021 for high risk screening, which revealed no MR evidence of malignancy in either breast.\par \par INTERVAL HISTORY 6/14/22\garth Donohue is here for her six months follow up visit \par She has no breast related complaints at this time.  She denies any breast pain, has not palpated any new palpable masses in either breast and denies any nipple discharge or retraction.\par \par Her imaging is as follows:\par 03/01/2022 b/l dx mammo\par - breasts are heterogeneously dense\par -Grouped amorphous calcifications in the right lateral breast demonstrate the suggestion of rim formation.\par In addition, these were likely present on prior mammograms dating back to 2017. These are stable from 2020. 2 year stability supports a benign etiology. \par -Additional stable calcifications are seen within the left breast.\par - There is a new benign rim calcification in the central right breast. BI-RADS 2

## 2022-06-14 NOTE — ASSESSMENT
[FreeTextEntry1] : CROW is a [place] 51 year old patient who presented today in follow up for a history of fibrocystic breast changes; family hx of breast cancer; high risk screening.  \par She has been doing well with no new breast related complaints. \par Physical exam was unrevealing today.\par \par Her imaging is as follows:\par 03/01/2022 b/l dx mammo\par - breasts are heterogeneously dense\par -Grouped amorphous calcifications in the right lateral breast demonstrate the suggestion of rim formation.\par In addition, these were likely present on prior mammograms dating back to 2017. These are stable from 2020. 2 year stability supports a benign etiology. \par -Additional stable calcifications are seen within the left breast.\par - There is a new benign rim calcification in the central right breast. BI-RADS 2\par \par \par We discussed dense breasts.  Increasing breast density has been found to increase ones risk of breast cancer, but at this time, there is no clear indication for additional imaging in this setting, as both US and MRI have not been found to improve survival.  One can consider bilateral screening US.  However, out of 1000 women screened, the use of routine US will only identify an additional 3-5 cancers.  The use of US was found to increase the likelihood of undergoing more imaging and more biopsies.  She does have dense breasts.  We have decided to proceed with screening bilateral breast US at this time.  This will be scheduled with her next screening mammogram.\par \par \par PLAN:\par -MRI  in September 2022\par -bilateral mammogram and US  will be due in March 2023 \par -follow up after \par \par All of her questions were appropriately answered.\par She knows to call with any concerns.

## 2022-06-14 NOTE — PHYSICAL EXAM
[Normocephalic] : normocephalic [Atraumatic] : atraumatic [EOMI] : extra ocular movement intact [Examined in the supine and seated position] : examined in the supine and seated position [Symmetrical] : symmetrical [No dominant masses] : no dominant masses in right breast  [No dominant masses] : no dominant masses left breast [No Nipple Retraction] : no left nipple retraction [No Nipple Discharge] : no left nipple discharge [No Axillary Lymphadenopathy] : no left axillary lymphadenopathy [No Edema] : no edema [No Rashes] : no rashes [No Ulceration] : no ulceration [de-identified] : On physical exam, there are no discrete masses in either breast or axilla. There is no nipple discharge or inversion bilaterally. There are no skin changes bilaterally. B/l implants intact and soft on palpation \par \par

## 2022-06-14 NOTE — DATA REVIEWED
[FreeTextEntry1] : EXAM:  MG MAMMO DIAG W DHEERAJ BI#\par \par \par PROCEDURE DATE:  03/01/2022\par \par \par \par INTERPRETATION:  Clinical History / Reason for exam:  Follow-up of probably benign right breast calcifications first identified in January 2020.\par \par The patient reports her last clinical breast examination was performed for months ago.\par \par FAMILY HISTORY: Sister, at age 50, breast cancer.\par \par COMPARISONS: Multiple mammograms dating back to 10/20/2017.\par \par VIEWS OBTAINED: Routine and pushback views of both breasts as well as spot compression magnification views of the right breast.. Tomosynthesis images were obtained as well\par \par Computer-aided detection was utilized in the interpretation of this examination.\par \par BREAST COMPOSITION: The breasts are heterogeneously dense, which may obscure small masses.\par \par FINDINGS:\par \par MAMMOGRAM:\par Grouped amorphous calcifications in the right lateral breast demonstrate the suggestion of rim formation. In addition, these were likely present on prior mammograms dating back to 2017. These are stable from 2020. 2 year stability supports a benign etiology. Additional stable calcifications are seen within the left breast. There is a new benign rim calcification in the central right breast. No suspicious masses or areas of architectural distortion are seen in either breast. When compared to prior studies there is no significant interval change.\par \par IMPRESSION:\par \par Benign right breast calcifications as above. No mammographic evidence of malignancy in either breast.\par \par Recommendation: Unless otherwise indicated by clinical findings, the patient should resume annual screening in one year. Additionally given the patient's breast density and family history continued supplemental screening with breast MRI would be recommended.\par \par BI-RADS Category 2: Benign\par \par

## 2023-03-03 ENCOUNTER — OUTPATIENT (OUTPATIENT)
Dept: OUTPATIENT SERVICES | Facility: HOSPITAL | Age: 53
LOS: 1 days | End: 2023-03-03
Payer: COMMERCIAL

## 2023-03-03 ENCOUNTER — RESULT REVIEW (OUTPATIENT)
Age: 53
End: 2023-03-03

## 2023-03-03 DIAGNOSIS — R92.2 INCONCLUSIVE MAMMOGRAM: ICD-10-CM

## 2023-03-03 DIAGNOSIS — Z12.31 ENCOUNTER FOR SCREENING MAMMOGRAM FOR MALIGNANT NEOPLASM OF BREAST: ICD-10-CM

## 2023-03-03 DIAGNOSIS — Z00.8 ENCOUNTER FOR OTHER GENERAL EXAMINATION: ICD-10-CM

## 2023-03-03 PROCEDURE — 76641 ULTRASOUND BREAST COMPLETE: CPT | Mod: 50

## 2023-03-03 PROCEDURE — 77063 BREAST TOMOSYNTHESIS BI: CPT | Mod: 26

## 2023-03-03 PROCEDURE — 77067 SCR MAMMO BI INCL CAD: CPT | Mod: 26

## 2023-03-03 PROCEDURE — 77067 SCR MAMMO BI INCL CAD: CPT

## 2023-03-03 PROCEDURE — 77063 BREAST TOMOSYNTHESIS BI: CPT

## 2023-03-03 PROCEDURE — 76641 ULTRASOUND BREAST COMPLETE: CPT | Mod: 26,50

## 2023-03-06 ENCOUNTER — NON-APPOINTMENT (OUTPATIENT)
Age: 53
End: 2023-03-06

## 2023-06-14 ENCOUNTER — APPOINTMENT (OUTPATIENT)
Dept: BREAST CENTER | Facility: CLINIC | Age: 53
End: 2023-06-14
Payer: COMMERCIAL

## 2023-06-14 VITALS
DIASTOLIC BLOOD PRESSURE: 81 MMHG | WEIGHT: 117 LBS | HEIGHT: 60 IN | SYSTOLIC BLOOD PRESSURE: 127 MMHG | BODY MASS INDEX: 22.97 KG/M2

## 2023-06-14 PROCEDURE — 99213 OFFICE O/P EST LOW 20 MIN: CPT

## 2023-06-15 NOTE — HISTORY OF PRESENT ILLNESS
[FreeTextEntry1] : Patient with history of Left breast calcifications not amenable to stereotactic core biopsy; status post Left NLOC 8/20/12 demonstrating fibrocystic changes with florid duct hyperplasia, sclerosing adenosis, papillary/cystic apocrine metaplasia.\par No prior complaints related to the breasts. \par B/L saline implants - subpectoral.\par \par Her family history is significant for her sister with breast cancer at 48.  \par \par Her Mary Model risk assessment is:\par 2.0 % in five years \par 19.7 % in her lifetime.\par \par Left breast fibrocystic changes and PASH on ultrasound guided core biopsy 11/7/16; 1:00 N5, 12 mm.\par \par \par CROW WEATHERS is a 52 year old female patient who presents today in follow up for fibrocystic breast changes; family hx of breast cancer; high risk screening.\par Since her last visit, she has no new breast related complaints. \par \par Most recent imaging:\par B/L Screening Mammo - 03/03/2023:\par -The breasts are heterogeneously dense, which may obscure small masses.\par -There are bilateral saline implants.\par -There is a stable circumscribed mass seen in the right breast.\par -There are stable calcifications seen in both breasts.\par -There is no mammographic evidence of malignancy.\par BI-RADS Category 2:  Benign\par \par B/L Breast Sono - 03/03/2023:\par -Bilateral implants are partially visualized.\par Right breast:\par -3:00 position, there is a circumscribed hypoechoic mass measuring 0.9 x 0.6 x 0.3 cm, likely corresponding to a mammographically stable mass and is probably benign. Short interval follow-up is recommended.\par Left breast:\par -Previously biopsied mass at the 1:00 position 5 cm from the nipple the longer seen on the current exam.\par Impression: No sonographic evidence of malignancy.\par BI-RADS category 3: Probably Benign\par \par She presents today for evaluation and imaging review.

## 2023-06-15 NOTE — DATA REVIEWED
[FreeTextEntry1] : B/L Screening Mammo - 03/03/2023:\par MAMMOGRAM FINDINGS:\par Mammography was performed including the following views: bilateral \par craniocaudal with tomosynthesis, bilateral mediolateral oblique with \par tomosynthesis.  The examination includes digital synthetic 2D and digital \par tomosynthesis 3D images. Additional imaging analysis was performed using \par CAD (computer-aided detection) software.\par \par The breasts are heterogeneously dense, which may obscure small masses.\par \par There are bilateral saline implants.\par \par Finding 1:  There is a stable circumscribed mass seen in the right breast.\par \par Finding 2:  There are stable calcifications seen in both breasts.\par \par No suspicious mass, grouping of calcifications, or other abnormality is \par identified.\par \par IMPRESSION:\par There is no mammographic evidence of malignancy.\par \par RECOMMENDATION:\par Unless otherwise indicated by clinical findings, annual screening \par mammography recommended.\par \par ASSESSMENT:\par BI-RADS Category 2:  Benign\par \par \par B/L Breast Sono - 03/03/2023:\par Findings:\par \par Ultrasound:\par \par Bilateral whole breast ultrasound was performed.\par \par Bilateral implants are partially visualized.\par \par Right breast:\par At the 3:00 position, there is a circumscribed hypoechoic mass measuring \par 0.9 x 0.6 x 0.3 cm, likely corresponding to a mammographically stable \par mass and is probably benign. Short interval follow-up is recommended.\par \par No additional solid or cystic masses. No axillary adenopathy.\par \par Left breast:\par Previously biopsied mass at the 1:00 position 5 cm from the nipple the \par longer seen on the current exam. No additional solid or cystic masses. No \par axillary adenopathy.\par \par Impression: No sonographic evidence of malignancy.\par \par Recommendation: Follow-up breast ultrasound in 6 months.\par \par BI-RADS category 3: Probably Benign\par \par

## 2023-06-15 NOTE — PHYSICAL EXAM
[Normocephalic] : normocephalic [Atraumatic] : atraumatic [No Supraclavicular Adenopathy] : no supraclavicular adenopathy [No dominant masses] : no dominant masses in right breast  [No dominant masses] : no dominant masses left breast [No Nipple Discharge] : no left nipple discharge [No Rashes] : no rashes [No Ulceration] : no ulceration [Breast Nipple Inversion] : nipples not inverted [Breast Nipple Retraction] : nipples not retracted [de-identified] : well healed surgical scars. [de-identified] : No axillary lymphadenopathy appreciated. [de-identified] : No axillary lymphadenopathy appreciated.

## 2023-06-15 NOTE — ASSESSMENT
[FreeTextEntry1] : CROW is a [place] 52 year old patient who presented today in follow up for a history of fibrocystic breast changes; family hx of breast cancer; high risk screening.  \par She has been doing well with no new breast related complaints. \par \par Most recent imaging:\par B/L Screening Mammo - 03/03/2023:\par -The breasts are heterogeneously dense, which may obscure small masses.\par -There are bilateral saline implants.\par -There is a stable circumscribed mass seen in the right breast.\par -There are stable calcifications seen in both breasts.\par -There is no mammographic evidence of malignancy.\par BI-RADS Category 2:  Benign\par \par B/L Breast Sono - 03/03/2023:\par -Bilateral implants are partially visualized.\par Right breast:\par -3:00 position, there is a circumscribed hypoechoic mass measuring 0.9 x 0.6 x 0.3 cm, likely corresponding to a mammographically stable mass and is probably benign. Short interval follow-up is recommended.\par Left breast:\par -Previously biopsied mass at the 1:00 position 5 cm from the nipple the longer seen on the current exam.\par Impression: No sonographic evidence of malignancy.\par BI-RADS category 3: Probably Benign, as detailed above. \par \par Physical exam was unrevealing today.\par \par We discussed BIRADS 3 lesions. These lesions have a 2% chance of harboring malignancy. There is always an option to obtain a tissue sample with a biopsy to confirm the diagnosis. The procedure was described in detail including the placement of a tissue marker clip. The risks of the procedure, including but not limited to bleeding and infection were also explained. She is not interested in biopsy at this time and agrees to continue with short-term interval imaging, which is traditionally performed at six-month intervals for approximately two years to establish stability.\par \par Imaging with a right breast targeted sonogram for short term follow-up as well as a bilateral breast MRI for high risk screening will be ordered for September 2023.\par She will return for follow-up and clinical breast exam following imaging.\par \par \par I spent a total of 20 minutes of face to face time with this patient, greater than 50% of which was spent in counseling and/or coordination of care.\par All of her questions were appropriately answered.\par She knows to call with any concerns.

## 2023-09-19 ENCOUNTER — RESULT REVIEW (OUTPATIENT)
Age: 53
End: 2023-09-19

## 2023-09-19 ENCOUNTER — OUTPATIENT (OUTPATIENT)
Dept: OUTPATIENT SERVICES | Facility: HOSPITAL | Age: 53
LOS: 1 days | End: 2023-09-19
Payer: COMMERCIAL

## 2023-09-19 DIAGNOSIS — Z00.8 ENCOUNTER FOR OTHER GENERAL EXAMINATION: ICD-10-CM

## 2023-09-19 DIAGNOSIS — R92.8 OTHER ABNORMAL AND INCONCLUSIVE FINDINGS ON DIAGNOSTIC IMAGING OF BREAST: ICD-10-CM

## 2023-09-19 PROCEDURE — 76642 ULTRASOUND BREAST LIMITED: CPT | Mod: 26,RT

## 2023-09-19 PROCEDURE — 76642 ULTRASOUND BREAST LIMITED: CPT | Mod: RT

## 2023-09-20 DIAGNOSIS — R92.8 OTHER ABNORMAL AND INCONCLUSIVE FINDINGS ON DIAGNOSTIC IMAGING OF BREAST: ICD-10-CM

## 2023-10-09 ENCOUNTER — OUTPATIENT (OUTPATIENT)
Dept: OUTPATIENT SERVICES | Facility: HOSPITAL | Age: 53
LOS: 1 days | End: 2023-10-09
Payer: COMMERCIAL

## 2023-10-09 ENCOUNTER — RESULT REVIEW (OUTPATIENT)
Age: 53
End: 2023-10-09

## 2023-10-09 DIAGNOSIS — Z12.39 ENCOUNTER FOR OTHER SCREENING FOR MALIGNANT NEOPLASM OF BREAST: ICD-10-CM

## 2023-10-09 DIAGNOSIS — R92.8 OTHER ABNORMAL AND INCONCLUSIVE FINDINGS ON DIAGNOSTIC IMAGING OF BREAST: ICD-10-CM

## 2023-10-09 PROCEDURE — 77049 MRI BREAST C-+ W/CAD BI: CPT | Mod: 26

## 2023-10-09 PROCEDURE — 77049 MRI BREAST C-+ W/CAD BI: CPT

## 2023-10-09 PROCEDURE — C8937: CPT

## 2023-10-09 PROCEDURE — A9579: CPT

## 2023-10-10 DIAGNOSIS — R92.8 OTHER ABNORMAL AND INCONCLUSIVE FINDINGS ON DIAGNOSTIC IMAGING OF BREAST: ICD-10-CM

## 2023-10-10 DIAGNOSIS — Z12.39 ENCOUNTER FOR OTHER SCREENING FOR MALIGNANT NEOPLASM OF BREAST: ICD-10-CM

## 2023-11-07 ENCOUNTER — APPOINTMENT (OUTPATIENT)
Dept: BREAST CENTER | Facility: CLINIC | Age: 53
End: 2023-11-07
Payer: COMMERCIAL

## 2023-11-07 PROCEDURE — 99213 OFFICE O/P EST LOW 20 MIN: CPT

## 2024-03-04 ENCOUNTER — OUTPATIENT (OUTPATIENT)
Dept: OUTPATIENT SERVICES | Facility: HOSPITAL | Age: 54
LOS: 1 days | End: 2024-03-04
Payer: COMMERCIAL

## 2024-03-04 ENCOUNTER — RESULT REVIEW (OUTPATIENT)
Age: 54
End: 2024-03-04

## 2024-03-04 DIAGNOSIS — R92.8 OTHER ABNORMAL AND INCONCLUSIVE FINDINGS ON DIAGNOSTIC IMAGING OF BREAST: ICD-10-CM

## 2024-03-04 PROCEDURE — G0279: CPT | Mod: 26

## 2024-03-04 PROCEDURE — 77066 DX MAMMO INCL CAD BI: CPT

## 2024-03-04 PROCEDURE — 76641 ULTRASOUND BREAST COMPLETE: CPT | Mod: 26,50

## 2024-03-04 PROCEDURE — G0279: CPT

## 2024-03-04 PROCEDURE — 77066 DX MAMMO INCL CAD BI: CPT | Mod: 26

## 2024-03-04 PROCEDURE — 76642 ULTRASOUND BREAST LIMITED: CPT | Mod: 50

## 2024-03-04 PROCEDURE — 76641 ULTRASOUND BREAST COMPLETE: CPT | Mod: 50

## 2024-03-05 DIAGNOSIS — R92.8 OTHER ABNORMAL AND INCONCLUSIVE FINDINGS ON DIAGNOSTIC IMAGING OF BREAST: ICD-10-CM

## 2024-03-19 ENCOUNTER — APPOINTMENT (OUTPATIENT)
Dept: BREAST CENTER | Facility: CLINIC | Age: 54
End: 2024-03-19
Payer: COMMERCIAL

## 2024-03-19 VITALS
HEART RATE: 68 BPM | BODY MASS INDEX: 23.16 KG/M2 | HEIGHT: 60 IN | SYSTOLIC BLOOD PRESSURE: 133 MMHG | DIASTOLIC BLOOD PRESSURE: 77 MMHG | WEIGHT: 118 LBS

## 2024-03-19 DIAGNOSIS — R92.8 OTHER ABNORMAL AND INCONCLUSIVE FINDINGS ON DIAGNOSTIC IMAGING OF BREAST: ICD-10-CM

## 2024-03-19 DIAGNOSIS — Z12.39 ENCOUNTER FOR OTHER SCREENING FOR MALIGNANT NEOPLASM OF BREAST: ICD-10-CM

## 2024-03-19 DIAGNOSIS — N60.12 DIFFUSE CYSTIC MASTOPATHY OF LEFT BREAST: ICD-10-CM

## 2024-03-19 DIAGNOSIS — N60.11 DIFFUSE CYSTIC MASTOPATHY OF LEFT BREAST: ICD-10-CM

## 2024-03-19 DIAGNOSIS — Z80.3 FAMILY HISTORY OF MALIGNANT NEOPLASM OF BREAST: ICD-10-CM

## 2024-03-19 PROCEDURE — 99213 OFFICE O/P EST LOW 20 MIN: CPT

## 2024-03-19 NOTE — PHYSICAL EXAM
[Normocephalic] : normocephalic [Atraumatic] : atraumatic [No Supraclavicular Adenopathy] : no supraclavicular adenopathy [No dominant masses] : no dominant masses in right breast  [No dominant masses] : no dominant masses left breast [No Nipple Discharge] : no left nipple discharge [No Rashes] : no rashes [No Ulceration] : no ulceration [Breast Nipple Inversion] : nipples not inverted [Breast Nipple Retraction] : nipples not retracted [de-identified] : B/L subpectoral implants noted. well healed surgical scars. [de-identified] : No axillary lymphadenopathy appreciated. [de-identified] : No axillary lymphadenopathy appreciated.

## 2024-03-19 NOTE — REASON FOR VISIT
[Follow-Up: _____] : a [unfilled] follow-up visit [FreeTextEntry1] : fibrocystic breast changes; family hx of breast cancer; high risk screening; BIRADS-3 imaging review.

## 2024-03-19 NOTE — HISTORY OF PRESENT ILLNESS
[FreeTextEntry1] : Patient with history of Left breast calcifications not amenable to stereotactic core biopsy; status post Left NLOC 8/20/12 demonstrating fibrocystic changes with florid duct hyperplasia, sclerosing adenosis, papillary/cystic apocrine metaplasia. No prior complaints related to the breasts.  B/L saline implants - subpectoral.  Her family history is significant for her sister with breast cancer at 48.    Her Mary Model risk assessment is: 2.0 % in five years  19.7 % in her lifetime.  Left breast fibrocystic changes and PASH on ultrasound guided core biopsy 11/7/16; 1:00 N5, 12 mm.   CROW WEATHERS is a 53 year old female patient who presents today in follow up for fibrocystic breast changes; family hx of breast cancer; high risk screening; BIRADS-3 imaging review. Since her last visit, she has no new breast related complaints.   B/L Dx Mammo & Sono - 03/04/2024: -The breasts are heterogeneously dense, which may obscure small masses. -There are bilateral silicone breast implants. -There is a stable mass within the medial aspect of the right breast.  -No suspicious mass, microcalcifications or areas of architectural distortion is seen in either breast.  Ultrasound: -Stable ovoid mass is noted in the right breast at 3:00 3 cm from the nipple corresponding to the mammographic finding. It measures 9 x 6 x 3 mm.  -No other masses or cysts are identified in either breast or axilla. BI-RADS category 3: Probably Benign  She presents today for evaluation and imaging review.

## 2024-03-19 NOTE — ASSESSMENT
[FreeTextEntry1] : CROW is a [place] 53 year old patient who presented today in follow up for a history of fibrocystic breast changes; family hx of breast cancer; high risk screening, BIRADS-3 imaging review. She has been doing well with no new breast related complaints.  B/L Dx Mammo & Sono - 03/04/2024: -The breasts are heterogeneously dense, which may obscure small masses. -There are bilateral silicone breast implants. -There is a stable mass within the medial aspect of the right breast.  -No suspicious mass, microcalcifications or areas of architectural distortion is seen in either breast.  Ultrasound: -Stable ovoid mass is noted in the right breast at 3:00 3 cm from the nipple corresponding to the mammographic finding. It measures 9 x 6 x 3 mm.  -No other masses or cysts are identified in either breast or axilla. BI-RADS category 3: Probably Benign, as detailed above.   Physical exam was unrevealing today.  We discussed BIRADS 3 lesions. These lesions have a 2% chance of harboring malignancy. There is always an option to obtain a tissue sample with a biopsy to confirm the diagnosis. The procedure was described in detail including the placement of a tissue marker clip. The risks of the procedure, including but not limited to bleeding and infection were also explained. She is not interested in biopsy at this time and agrees to continue with short-term interval imaging, which is traditionally performed at six-month intervals for approximately two years to establish stability.    Imaging with a right breast targeted sonogram for short term follow-up will be due in September 2024, and that will be ordered today. She will also be scheduled for a B/L Breast MRI for high risk screening for October 2024. She will return for follow-up and clinical breast exam following imaging.   I spent a total of 20 minutes of face to face time with this patient, greater than 50% of which was spent in counseling and/or coordination of care. All of her questions were appropriately answered. She knows to call with any concerns.

## 2024-03-19 NOTE — DATA REVIEWED
[FreeTextEntry1] : B/L Dx Mammo & Sono - 03/04/2024: Breast composition:The breasts are heterogeneously dense, which may obscure small masses.  Findings:  Mammogram:  There are bilateral silicone breast implants.There is a stable mass within the medial aspect of the right breast. No suspicious mass, microcalcifications or areas of architectural distortion is seen in either breast. When compared to the previous examinations there is no significant interval change and nothing to suggest malignancy.  Ultrasound:  Bilateral whole breast ultrasound was performed.  Stable ovoid mass is noted in the right breast at 3:00 3 cm from the nipple corresponding to the mammographic finding. It measures 9 x 6 x 3 mm. No other masses or cysts are identified in either breast or axilla.   Impression:   No mammographic or sonographic evidence of malignancy.  Stable ovoid mass in the right breast at 3:00 is probably benign.  Recommendation: Follow-up breast ultrasound in 6 months.  BI-RADS category 3: Probably Benign

## 2024-03-28 ENCOUNTER — APPOINTMENT (OUTPATIENT)
Dept: OBGYN | Facility: CLINIC | Age: 54
End: 2024-03-28
Payer: COMMERCIAL

## 2024-03-28 VITALS
SYSTOLIC BLOOD PRESSURE: 140 MMHG | WEIGHT: 118 LBS | DIASTOLIC BLOOD PRESSURE: 85 MMHG | BODY MASS INDEX: 23.16 KG/M2 | HEIGHT: 60 IN

## 2024-03-28 DIAGNOSIS — Z01.419 ENCOUNTER FOR GYNECOLOGICAL EXAMINATION (GENERAL) (ROUTINE) W/OUT ABNORMAL FINDINGS: ICD-10-CM

## 2024-03-28 PROCEDURE — 99396 PREV VISIT EST AGE 40-64: CPT

## 2024-03-28 NOTE — PHYSICAL EXAM
[Appropriately responsive] : appropriately responsive [Alert] : alert [No Acute Distress] : no acute distress [No Lymphadenopathy] : no lymphadenopathy [No Murmurs] : no murmurs [Regular Rate Rhythm] : regular rate rhythm [Clear to Auscultation B/L] : clear to auscultation bilaterally [Soft] : soft [Non-distended] : non-distended [Non-tender] : non-tender [No HSM] : No HSM [No Lesions] : no lesions [No Mass] : no mass [Oriented x3] : oriented x3 [Examination Of The Breasts] : a normal appearance [No Masses] : no breast masses were palpable [Labia Majora] : normal [Labia Minora] : normal [Normal] : normal [Uterine Adnexae] : normal

## 2024-04-01 LAB — HPV HIGH+LOW RISK DNA PNL CVX: NOT DETECTED

## 2024-04-08 LAB — CYTOLOGY CVX/VAG DOC THIN PREP: NORMAL

## 2024-09-09 ENCOUNTER — RESULT REVIEW (OUTPATIENT)
Age: 54
End: 2024-09-09

## 2024-09-09 ENCOUNTER — OUTPATIENT (OUTPATIENT)
Dept: OUTPATIENT SERVICES | Facility: HOSPITAL | Age: 54
LOS: 1 days | End: 2024-09-09
Payer: COMMERCIAL

## 2024-09-09 DIAGNOSIS — R92.8 OTHER ABNORMAL AND INCONCLUSIVE FINDINGS ON DIAGNOSTIC IMAGING OF BREAST: ICD-10-CM

## 2024-09-09 PROCEDURE — 76642 ULTRASOUND BREAST LIMITED: CPT | Mod: RT

## 2024-09-09 PROCEDURE — 76642 ULTRASOUND BREAST LIMITED: CPT | Mod: 26,RT

## 2024-09-10 DIAGNOSIS — R92.8 OTHER ABNORMAL AND INCONCLUSIVE FINDINGS ON DIAGNOSTIC IMAGING OF BREAST: ICD-10-CM

## 2024-10-23 ENCOUNTER — RESULT REVIEW (OUTPATIENT)
Age: 54
End: 2024-10-23

## 2024-10-23 ENCOUNTER — OUTPATIENT (OUTPATIENT)
Dept: OUTPATIENT SERVICES | Facility: HOSPITAL | Age: 54
LOS: 1 days | End: 2024-10-23
Payer: COMMERCIAL

## 2024-10-23 DIAGNOSIS — N60.12 DIFFUSE CYSTIC MASTOPATHY OF LEFT BREAST: ICD-10-CM

## 2024-10-23 PROCEDURE — 77049 MRI BREAST C-+ W/CAD BI: CPT

## 2024-10-23 PROCEDURE — 77049 MRI BREAST C-+ W/CAD BI: CPT | Mod: 26

## 2024-10-23 PROCEDURE — A9579: CPT

## 2024-10-23 PROCEDURE — C8937: CPT

## 2024-10-24 DIAGNOSIS — N60.12 DIFFUSE CYSTIC MASTOPATHY OF LEFT BREAST: ICD-10-CM

## 2025-04-25 ENCOUNTER — RESULT REVIEW (OUTPATIENT)
Age: 55
End: 2025-04-25

## 2025-04-25 ENCOUNTER — OUTPATIENT (OUTPATIENT)
Dept: OUTPATIENT SERVICES | Facility: HOSPITAL | Age: 55
LOS: 1 days | End: 2025-04-25
Payer: COMMERCIAL

## 2025-04-25 DIAGNOSIS — N60.11 DIFFUSE CYSTIC MASTOPATHY OF RIGHT BREAST: ICD-10-CM

## 2025-04-25 DIAGNOSIS — R92.8 OTHER ABNORMAL AND INCONCLUSIVE FINDINGS ON DIAGNOSTIC IMAGING OF BREAST: ICD-10-CM

## 2025-04-25 PROCEDURE — 76641 ULTRASOUND BREAST COMPLETE: CPT | Mod: 26,50

## 2025-04-25 PROCEDURE — 77066 DX MAMMO INCL CAD BI: CPT

## 2025-04-25 PROCEDURE — 76641 ULTRASOUND BREAST COMPLETE: CPT | Mod: 50

## 2025-04-25 PROCEDURE — 77066 DX MAMMO INCL CAD BI: CPT | Mod: 26

## 2025-04-25 PROCEDURE — G0279: CPT | Mod: 26

## 2025-04-25 PROCEDURE — G0279: CPT

## 2025-04-26 DIAGNOSIS — N60.11 DIFFUSE CYSTIC MASTOPATHY OF RIGHT BREAST: ICD-10-CM

## 2025-04-26 DIAGNOSIS — R92.8 OTHER ABNORMAL AND INCONCLUSIVE FINDINGS ON DIAGNOSTIC IMAGING OF BREAST: ICD-10-CM

## 2025-05-13 ENCOUNTER — APPOINTMENT (OUTPATIENT)
Dept: BREAST CENTER | Facility: CLINIC | Age: 55
End: 2025-05-13
Payer: COMMERCIAL

## 2025-05-13 ENCOUNTER — NON-APPOINTMENT (OUTPATIENT)
Age: 55
End: 2025-05-13

## 2025-05-13 VITALS
SYSTOLIC BLOOD PRESSURE: 138 MMHG | HEIGHT: 60 IN | HEART RATE: 79 BPM | DIASTOLIC BLOOD PRESSURE: 82 MMHG | BODY MASS INDEX: 23.16 KG/M2 | WEIGHT: 118 LBS

## 2025-05-13 DIAGNOSIS — Z80.3 FAMILY HISTORY OF MALIGNANT NEOPLASM OF BREAST: ICD-10-CM

## 2025-05-13 DIAGNOSIS — N60.11 DIFFUSE CYSTIC MASTOPATHY OF LEFT BREAST: ICD-10-CM

## 2025-05-13 DIAGNOSIS — Z12.39 ENCOUNTER FOR OTHER SCREENING FOR MALIGNANT NEOPLASM OF BREAST: ICD-10-CM

## 2025-05-13 DIAGNOSIS — N60.12 DIFFUSE CYSTIC MASTOPATHY OF LEFT BREAST: ICD-10-CM

## 2025-05-13 PROCEDURE — 99213 OFFICE O/P EST LOW 20 MIN: CPT
